# Patient Record
Sex: FEMALE | Race: WHITE
[De-identification: names, ages, dates, MRNs, and addresses within clinical notes are randomized per-mention and may not be internally consistent; named-entity substitution may affect disease eponyms.]

---

## 2017-10-02 ENCOUNTER — HOSPITAL ENCOUNTER (EMERGENCY)
Dept: HOSPITAL 80 - FED | Age: 52
Discharge: HOME | End: 2017-10-02
Payer: COMMERCIAL

## 2017-10-02 VITALS
HEART RATE: 73 BPM | OXYGEN SATURATION: 99 % | SYSTOLIC BLOOD PRESSURE: 111 MMHG | TEMPERATURE: 98.4 F | DIASTOLIC BLOOD PRESSURE: 74 MMHG

## 2017-10-02 VITALS — RESPIRATION RATE: 16 BRPM

## 2017-10-02 DIAGNOSIS — R10.9: Primary | ICD-10-CM

## 2017-10-02 DIAGNOSIS — E86.9: ICD-10-CM

## 2017-10-02 LAB
% IMMATURE GRANULYOCYTES: 0.2 % (ref 0–1.1)
ABSOLUTE IMMATURE GRANULOCYTES: 0.01 10^3/UL (ref 0–0.1)
ABSOLUTE NRBC COUNT: 0 10^3/UL (ref 0–0.01)
ADD DIFF?: NO
ADD MORPH?: NO
ADD SCAN?: NO
ANION GAP SERPL CALC-SCNC: 16 MEQ/L (ref 8–16)
ATYPICAL LYMPHOCYTE FLAG: 0 (ref 0–99)
CALCIUM SERPL-MCNC: 9.8 MG/DL (ref 8.5–10.4)
CHLORIDE SERPL-SCNC: 103 MEQ/L (ref 97–110)
CO2 SERPL-SCNC: 22 MEQ/L (ref 22–31)
CREAT SERPL-MCNC: 0.8 MG/DL (ref 0.6–1)
ERYTHROCYTE [DISTWIDTH] IN BLOOD BY AUTOMATED COUNT: 13.1 % (ref 11.5–15.2)
FRAGMENT RBC FLAG: 0 (ref 0–99)
GFR SERPL CREATININE-BSD FRML MDRD: > 60 ML/MIN/{1.73_M2}
GLUCOSE SERPL-MCNC: 81 MG/DL (ref 70–100)
HCT VFR BLD CALC: 38.7 % (ref 38–47)
HGB BLD-MCNC: 13.2 G/DL (ref 12.6–16.3)
LEFT SHIFT FLG: 0 (ref 0–99)
LIPEMIA HEMOLYSIS FLAG: 90 (ref 0–99)
MCH RBC BLDCO QN: 33.8 PG (ref 27.9–34.1)
MCHC RBC AUTO-ENTMCNC: 34.1 G/DL (ref 32.4–36.7)
MCV RBC AUTO: 99 FL (ref 81.5–99.8)
NRBC-AUTO%: 0 % (ref 0–0.2)
PLATELET # BLD: 185 10^3/UL (ref 150–400)
PLATELET CLUMPS FLAG: 0 (ref 0–99)
PMV BLD AUTO: 10.7 FL (ref 8.7–11.7)
POTASSIUM SERPL-SCNC: 3.7 MEQ/L (ref 3.5–5.2)
RBC # BLD AUTO: 3.91 10^6/UL (ref 4.18–5.33)
SODIUM SERPL-SCNC: 141 MEQ/L (ref 134–144)

## 2017-10-02 NOTE — EDPHY
H & P


Time Seen by Provider: 10/02/17 18:38


HPI/ROS: 





CHIEF COMPLAINT:  Right flank and abdominal pain





HISTORY OF PRESENT ILLNESS:  52-year-old female presents with right flank 

abdominal pain.  Onset of right-sided pain 5 days ago.  The pain is moderate, 

waxes and wanes and is intermittent.  The pain is sometimes in the right flank, 

other times in the right mid abdomen.  Associated with constipation today.  No 

alleviating/aggrevating factors.  No change with eating.  No fever, urinary 

symptoms, vomiting, or diarrhea.





REVIEW OF SYSTEMS:


Constitutional:  No fever, no chills


Eyes:  No visual changes


ENT:  No sore throat


Respiratory:  No cough, no shortness of breath


Cardiac:  No chest pain


Genitourinary:  No hematuria, no dysuria


Musculoskeletal:  No leg pain or swelling


Skin:  No rash


Neurological:  No headache, no weakness


Psychiatric:  No depression





Past Medical/Surgical History: 





Denies





Social History: 





No recent alcohol





Smoking Status: Never smoked


Physical Exam: 





General Appearance:  Alert, pleasant, does not appear in pain


Eyes:  Pupils equal and round, no conjunctival pallor or injection


ENT, Mouth:  Mucous membranes moist


Neck:  Normal inspection


Respiratory:  Lungs are clear to auscultation


Cardiovascular:  Regular rate and rhythm


Gastrointestinal:  Abdomen is soft and nontender


Back:  No CVA tenderness


Neurological:  A&O, nonfocal, normal gait


Skin:  Warm and dry, no rash


Extremities:  normal inspection


Psychiatric:  Mood and affect normal





Constitutional: 


 Initial Vital Signs











Temperature (C)  36.5 C   10/02/17 18:28


 


Heart Rate  83   10/02/17 18:28


 


Respiratory Rate  16   10/02/17 18:28


 


Blood Pressure  105/77   10/02/17 18:28


 


O2 Sat (%)  100   10/02/17 18:28








 











O2 Delivery Mode               Room Air














Allergies/Adverse Reactions: 


 





No Known Allergies Allergy (Unverified 01/25/14 18:01)


 








Home Medications: 














 Medication  Instructions  Recorded


 


Herbals/Supplements -Info Only  01/25/14


 


Multivitamins  01/25/14














Medical Decision Making





- Diagnostics


Imaging Results: 


CT abd/pelvis: small amt of free fluid in the pelvis, o/w unremarkable





Imaging: Discussed imaging studies w/ On call Radiologist


ED Course/Re-evaluation: 





This pt presents with intermittent rt sided abd/flank pain.  Concern for renal 

colic.  stat CT abd/pelvis with FF in pelvis only.  Doubt ruptured ovarian cyst

, given age, no pelvic tenderness and location of pain, but d/w pt.  Pain not 

typical of biliary colic, consider RUQ sono if pain persists.  Abd remains soft/

NT. Abd pain precuations given. 


Differential Diagnosis: 





Differential diagnosis includes though it is not limited to appendicitis, 

cholecystitis, diverticulitis, pyelonephritis, bowel perforation, small bowel 

obstruction.





- Data Points


Laboratory Results: 


 Laboratory Results





 10/02/17 18:58 





 10/02/17 18:58 








Medications Given: 


 








Discontinued Medications





Sodium Chloride (Ns)  1,000 mls @ 0 mls/hr IV EDNOW ONE; Wide Open


   PRN Reason: Protocol


   Stop: 10/02/17 18:47


   Last Admin: 10/02/17 18:51 Dose:  1,000 mls








Departure





- Departure


Disposition: Home, Routine, Self-Care


Clinical Impression: 


Abdominal pain


Qualifiers:


 Abdominal location: unspecified location Qualified Code(s): R10.9 - 

Unspecified abdominal pain





Condition: Good


Instructions:  Constipation (ED), Acute Abdominal Pain (ED)


Referrals: 


Vivian Fischer MD [Primary Care Provider] - As per Instructions

## 2018-03-22 ENCOUNTER — HOSPITAL ENCOUNTER (EMERGENCY)
Dept: HOSPITAL 80 - FED | Age: 53
LOS: 1 days | Discharge: HOME | End: 2018-03-23
Payer: COMMERCIAL

## 2018-03-22 VITALS — TEMPERATURE: 97.5 F

## 2018-03-22 DIAGNOSIS — E86.9: ICD-10-CM

## 2018-03-22 DIAGNOSIS — Z90.49: ICD-10-CM

## 2018-03-22 DIAGNOSIS — R10.13: Primary | ICD-10-CM

## 2018-03-22 NOTE — EDPHY
H & P


Stated Complaint: upper abd pain started this evening


Time Seen by Provider: 03/22/18 23:45


HPI/ROS: 





HPI





CHIEF COMPLAINT:  Abdominal pain





HISTORY OF PRESENT ILLNESS:  Patient very pleasant 53-year-old female, she is 

otherwise healthy, she states she takes lot of supplements, she presents 

emergency room with what she describes as prickling sensation across her 

epigastric region underneath the right rib pain left ribs.  And it radiates 

down into her lower abdomen.  She states she has had this for a while now but 

it has gotten more severe today.  She has had at since 2 o'clock in the 

afternoon.  She does state he gets slightly worse after she eats.  She recently 

had her gallbladder out in December.  She denies any chest pain or shortness of 

breath.  Denies pleuritic pain.  Denies fever vomiting.  She complains of 

prickling pain across her epigastric region radiating into her lower abdomen.


She did see her surgeon Dr. Venancio Leos thinks it may be a bacterial 

overgrowth or bacterial infection her stomach possible H pylori she has given 

his stool sample but does not result yet.


  


Past Medical History:  Gallbladder disease status post cholecystectomy





Past Surgical History:  Cholecystectomy





Social History:  Denies drugs alcohol tobacco.





Family History:  Noncontributory








ROS   


REVIEW OF SYSTEMS:


A comprehensive 10 point review of systems is otherwise negative aside from 

elements mentioned in the history of present illness.








Exam   


Constitutional  appears well nontoxic no acute distress, triage nursing summary 

reviewed, vital signs reviewed, awake/alert. 


Eyes   normal conjunctivae and sclera, EOMI, PERRLA. 


HENT   normal inspection, atraumatic, moist mucus membranes, no epistaxis, neck 

supple/ no meningismus, no raccoon eyes. 


Respiratory   clear to auscultation bilaterally, normal breath sounds, no 

respiratory distress, no wheezing. 


Cardiovascular   rate normal, regular rhythm, no murmur, no edema, distal 

pulses normal. 


Gastrointestinal  I cannot elicit any significant tenderness on exam soft, non-

tender, no rebound, no guarding, normal bowel sounds, no distension, no 

pulsatile mass. 


Genitourinary   no CVA tenderness. 


Musculoskeletal  no midline vertebral tenderness, full range of motion, no calf 

swelling, no tenderness of extremities, no meningismus, good pulses, 

neurovascularly intact.


Skin   pink, warm, & dry, no rash, skin atraumatic. 


Neurologic   awake, alert and oriented x 3, AAOx3, moves all 4 extremities 

equally, motor intact, sensory intact, CN II-XII intact, normal cerebellar, 

normal vision, normal speech. 


Psychiatric   normal mood/affect. 


Heme/Lymph/Immune   no lymphadenopathy.





Differential diagnosis includes but is not limited to and in no particular order

:  Bowel obstruction, appendicitis, gallbladder disease, diverticulitis, colitis

, enteritis, perforated viscus, gastritis, GERD, esophagitis, urinary tract 

infection, pyelonephritis, kidney stones





Medical Decision Making:  Plan for this patient IV establishment blood draw, 

her abdomen is benign here in emergency room I do not feel that she would 

benefit from any imaging.  She has had previous multiple CT scans.  Recently 

had her gallbladder out December.  Will give a GI cocktail to see if this 

improves her discomfort.  Will check basic blood work and re-evaluate.





Re-evaluation:








EKG interpretation by me on record in [employer] system.  Impression time of 

EKG 0013:  Sinus rhythm rate of 65 no acute ischemic changes.  No ST elevation 

no ST depression no T-wave abnormalities.  Unremarkable EKG.








0106AM:  Re-examination at this time this patient is resting comfortably.  I 

did reexamine her abdomen is soft nontender.  I do not feel that she would 

benefit from any imaging at this time.


It appears clinically most likely she has gastritis versus H pylori versus 

peptic ulcer disease.





I do recommend she take Zantac for 2 weeks.  Additionally follow up with 

Gastroenterology.





Referral be given.





Additionally she understands return emergency room she develops worsening 

abdominal pain fever vomiting.





Zantac as prescribed.





Stay away from spicy fatty greasy foods she understands.


Source: Patient





- Personal History


LMP (Females 10-55): Post Menopausal


Current Tetanus/Diphtheria Vaccine: No


Current Tetanus Diphtheria and Acellular Pertussis (TDAP): No





- Medical/Surgical History


Hx Asthma: No


Hx Chronic Respiratory Disease: No


Hx Diabetes: No


Hx Cardiac Disease: No


Hx Renal Disease: No


Hx Cirrhosis: No


Hx Alcoholism: No


Hx HIV/AIDS: No


Hx Splenectomy or Spleen Trauma: No


Other PMH: cholecystectomy





- Social History


Smoking Status: Never smoked


Constitutional: 


 Initial Vital Signs











Temperature (C)  36.4 C   03/22/18 23:43


 


Heart Rate  70   03/22/18 23:43


 


Respiratory Rate  16   03/22/18 23:43


 


Blood Pressure  120/74   03/22/18 23:43


 


O2 Sat (%)  94   03/22/18 23:43








 











O2 Delivery Mode               Room Air














Allergies/Adverse Reactions: 


 





No Known Allergies Allergy (Verified 03/22/18 23:47)


 








Home Medications: 














 Medication  Instructions  Recorded


 


Herbals/Supplements -Info Only  01/25/14


 


Multivitamins  01/25/14


 


Ranitidine HCl [Zantac] 150 mg PO DAILY #30 tablet 03/23/18














Medical Decision Making





- Data Points


Laboratory Results: 


 Laboratory Results





 03/23/18 00:01 





 03/23/18 00:01 





 











  03/23/18 03/23/18 03/23/18





  00:01 00:01 00:01


 


WBC      





    


 


RBC      





    


 


Hgb      





    


 


Hct      





    


 


MCV      





    


 


MCH      





    


 


MCHC      





    


 


RDW      





    


 


Plt Count      





    


 


MPV      





    


 


Neut % (Auto)      





    


 


Lymph % (Auto)      





    


 


Mono % (Auto)      





    


 


Eos % (Auto)      





    


 


Baso % (Auto)      





    


 


Nucleat RBC Rel Count      





    


 


Absolute Neuts (auto)      





    


 


Absolute Lymphs (auto)      





    


 


Absolute Monos (auto)      





    


 


Absolute Eos (auto)      





    


 


Absolute Basos (auto)      





    


 


Absolute Nucleated RBC      





    


 


Immature Gran %      





    


 


Immature Gran #      





    


 


PT      13.7 SEC SEC





     (12.0-15.0) 


 


INR      1.03 





     (0.83-1.16) 


 


APTT      26.9 SEC SEC





     (23.0-38.0) 


 


Sodium    135 mEq/L mEq/L  





    (135-145)  


 


Potassium    3.7 mEq/L mEq/L  





    (3.5-5.2)  


 


Chloride    100 mEq/L mEq/L  





    ()  


 


Carbon Dioxide    25 mEq/l mEq/l  





    (22-31)  


 


Anion Gap    10 mEq/L mEq/L  





    (8-16)  


 


BUN    17 mg/dL mg/dL  





    (7-23)  


 


Creatinine    0.6 mg/dL mg/dL  





    (0.6-1.0)  


 


Estimated GFR    > 60   





    


 


Glucose    79 mg/dL mg/dL  





    ()  


 


Calcium    9.2 mg/dL mg/dL  





    (8.5-10.4)  


 


Total Bilirubin    0.6 mg/dL  D mg/dL  





    (0.1-1.4)  


 


Conjugated Bilirubin    0.2 mg/dL mg/dL  





    (0.0-0.5)  


 


Unconjugated Bilirubin    0.4 mg/dL mg/dL  





    (0.0-1.1)  


 


AST    32 IU/L IU/L  





    (14-46)  


 


ALT    48 IU/L IU/L  





    (9-52)  


 


Alkaline Phosphatase    53 IU/L IU/L  





    ()  


 


Troponin I    < 0.012 ng/mL ng/mL  





    (0.000-0.034)  


 


Total Protein    6.6 g/dL g/dL  





    (6.3-8.2)  


 


Albumin    4.2 g/dL g/dL  





    (3.5-5.0)  


 


Lipase    223 IU/L IU/L  





    ()  


 


Beta HCG, Qual  NEGATIVE     





    














  03/23/18





  00:01


 


WBC  4.91 10^3/uL 10^3/uL





   (3.80-9.50) 


 


RBC  3.79 10^6/uL L 10^6/uL





   (4.18-5.33) 


 


Hgb  13.1 g/dL g/dL





   (12.6-16.3) 


 


Hct  37.4 % L %





   (38.0-47.0) 


 


MCV  98.7 fL fL





   (81.5-99.8) 


 


MCH  34.6 pg H pg





   (27.9-34.1) 


 


MCHC  35.0 g/dL g/dL





   (32.4-36.7) 


 


RDW  12.4 % %





   (11.5-15.2) 


 


Plt Count  182 10^3/uL 10^3/uL





   (150-400) 


 


MPV  9.8 fL fL





   (8.7-11.7) 


 


Neut % (Auto)  39.9 % %





   (39.3-74.2) 


 


Lymph % (Auto)  48.3 % H %





   (15.0-45.0) 


 


Mono % (Auto)  7.9 % %





   (4.5-13.0) 


 


Eos % (Auto)  3.1 % %





   (0.6-7.6) 


 


Baso % (Auto)  0.6 % %





   (0.3-1.7) 


 


Nucleat RBC Rel Count  0.0 % %





   (0.0-0.2) 


 


Absolute Neuts (auto)  1.96 10^3/uL 10^3/uL





   (1.70-6.50) 


 


Absolute Lymphs (auto)  2.37 10^3/uL 10^3/uL





   (1.00-3.00) 


 


Absolute Monos (auto)  0.39 10^3/uL 10^3/uL





   (0.30-0.80) 


 


Absolute Eos (auto)  0.15 10^3/uL 10^3/uL





   (0.03-0.40) 


 


Absolute Basos (auto)  0.03 10^3/uL 10^3/uL





   (0.02-0.10) 


 


Absolute Nucleated RBC  0.00 10^3/uL 10^3/uL





   (0-0.01) 


 


Immature Gran %  0.2 % %





   (0.0-1.1) 


 


Immature Gran #  0.01 10^3/uL 10^3/uL





   (0.00-0.10) 


 


PT  





  


 


INR  





  


 


APTT  





  


 


Sodium  





  


 


Potassium  





  


 


Chloride  





  


 


Carbon Dioxide  





  


 


Anion Gap  





  


 


BUN  





  


 


Creatinine  





  


 


Estimated GFR  





  


 


Glucose  





  


 


Calcium  





  


 


Total Bilirubin  





  


 


Conjugated Bilirubin  





  


 


Unconjugated Bilirubin  





  


 


AST  





  


 


ALT  





  


 


Alkaline Phosphatase  





  


 


Troponin I  





  


 


Total Protein  





  


 


Albumin  





  


 


Lipase  





  


 


Beta HCG, Qual  





  











Medications Given: 


 








Discontinued Medications





Al Hydroxide/Mg Hydroxide (Maalox Susp)  30 ml PO ONCE ONE


   Stop: 03/22/18 23:57


   Last Admin: 03/23/18 00:06 Dose:  30 ml


Hyoscyamine Sulfate (Levsin, Hyomax-Sl)  0.25 mg PO ONCE ONE


   Stop: 03/22/18 23:57


   Last Admin: 03/23/18 00:07 Dose:  0.25 mg


Sodium Chloride (Ns)  1,000 mls @ 0 mls/hr IV EDNOW ONE; Wide Open


   PRN Reason: Protocol


   Stop: 03/22/18 23:48


   Last Admin: 03/22/18 23:59 Dose:  1,000 mls


Lidocaine (Lidocaine 2% Viscous)  15 ml PO ONCE ONE


   Stop: 03/22/18 23:57


   Last Admin: 03/23/18 00:07 Dose:  15 ml








Departure





- Departure


Disposition: Home, Routine, Self-Care


Clinical Impression: 


Abdominal pain


Qualifiers:


 Abdominal location: epigastric Qualified Code(s): R10.13 - Epigastric pain





Condition: Good


Instructions:  Acute Abdominal Pain (ED), Gastritis (ED)


Additional Instructions: 


1. Zantac as prescribed.


2. Stay away from spicy fatty greasy foods.


3. Follow up with her primary care doctor.


4. Follow up with Gastroenterology.


5. If you have severe pain vomiting blood in her stool worsening symptoms 

return to the emergency room.


Referrals: 


Vivian Fischer MD [Primary Care Provider] - As per Instructions


Maxi Louise MD [Medical Doctor] - As per Instructions


Prescriptions: 


Ranitidine HCl [Zantac] 150 mg PO DAILY #30 tablet

## 2018-03-23 VITALS
OXYGEN SATURATION: 96 % | RESPIRATION RATE: 18 BRPM | DIASTOLIC BLOOD PRESSURE: 73 MMHG | SYSTOLIC BLOOD PRESSURE: 108 MMHG | HEART RATE: 65 BPM

## 2018-03-23 LAB
INR PPP: 1.03 (ref 0.83–1.16)
PLATELET # BLD: 182 10^3/UL (ref 150–400)
PROTHROMBIN TIME: 13.7 SEC (ref 12–15)

## 2018-03-23 NOTE — CPEKG
Heart Rate: 65

RR Interval: 923

P-R Interval: 196

QRSD Interval: 90

QT Interval: 408

QTC Interval: 425

P Axis: 63

QRS Axis: 74

T Wave Axis: 63

EKG Severity - NORMAL ECG -

EKG Impression: SINUS RHYTHM

Electronically Signed By: Sreekanth Quiñones 23-Mar-2018 07:04:38